# Patient Record
(demographics unavailable — no encounter records)

---

## 2024-10-28 NOTE — HISTORY OF PRESENT ILLNESS
[FreeTextEntry1] : Patient presents for a post surgical visit.  35 yo POD14 s/p TLH, BS, ovarian cystectomy, cystoscopy presenting for 2-week post-op visit. Overall she is feeling well. Denies abdominal pain. Having normal BMs. Ambulating and eating without difficulty and as resumed low intensity daily activities. Last night she passed a 3cm clot when voiding but denies bleeding since. Normally has a small smear of red or brown the last 2 weeks. Denies pain today.

## 2024-10-28 NOTE — PLAN
[FreeTextEntry1] : 33 yo POD14 s/p TLH, BS, ovarian cystectomy, cystoscopy presenting for 2-week post-op visit. Diagnosis of endometriosis and implications reviewed, although patient no longer has a uterus, she may still form endometriomas. -incisions well-healing -no need to perform pelvic exam as patient is no longer bleeding- will check cuff at next visit -ED precautions reviewed, including heavy vaginal bleeding, abdominal pain, syncope -extensively reviewed post-op course and all questions answered -will return to work 4 weeks post-op  RTO in 4 weeks for 6-week post-op

## 2024-12-03 NOTE — HISTORY OF PRESENT ILLNESS
[Patient reported PAP Smear was normal] : Patient reported PAP Smear was normal [FreeTextEntry1] : 35yo s/p TLH, BS, ovarian cystectomy and cystoscopy for adenomyosis, endometriosis and b/l endometriomas. Patient states she is feeling well. Denies current pain or bleeding. States she stopped spotting 1 week ago. Walking without difficulty. Endorsing regular BM. Endorsing intense urge to urinate when it comes on. States she has not been incontinent but feels she has to run to the bathroom as soon as she gets the urge. Denies any dysuria, vaginal discharge or discomfort.  [PapSmeardate] : 5/2024

## 2024-12-03 NOTE — PHYSICAL EXAM
[Chaperone Present] : A chaperone was present in the examining room during all aspects of the physical examination [Appropriately responsive] : appropriately responsive [Alert] : alert [No Acute Distress] : no acute distress [Soft] : soft [Non-tender] : non-tender [Non-distended] : non-distended [No Lesions] : no lesions [No Mass] : no mass [Oriented x3] : oriented x3 [Labia Majora] : normal [Labia Minora] : normal [Normal] : normal [Absent] : absent [Uterine Adnexae] : non-palpable [FreeTextEntry5] : vaginal cuff intact, no pus or visualized leakage of fluid [FreeTextEntry8] : no tenderness on bimanual. Vaginal cuff intact

## 2024-12-03 NOTE — REVIEW OF SYSTEMS
[Urgency] : urgency [Negative] : Neurological [Frequency] : no frequency [Incontinence] : no incontinence [Dysuria] : no dysuria [Urethral Discharge] : no urethral discharge [Abn Vaginal bleeding] : no abnormal vaginal bleeding [Pelvic pain] : no pelvic pain [CVA Pain] : no CVA pain [Genital Rash/Irritation] : no genital rash/irritation

## 2024-12-03 NOTE — PLAN
[FreeTextEntry1] : 33yo POD43 s/p TLH, BS, ovarian cystectomy and cystoscopy for adenomyosis, endometriosis and b/l endometriomas. Healing appropriately, with no pain/bleeding and vaginal cuff intact on exam.   #Postop -Patient cleared for physical activity.   #Urinary urgency -Referral given for pelvic floor therapy -Reviewed kegel exercises  #Health Maintenance -Patient states she will continue GYN care with prior provider  MB, PGY1 D/w Dr. Hamilton

## 2024-12-03 NOTE — PLAN
[FreeTextEntry1] : 35yo POD43 s/p TLH, BS, ovarian cystectomy and cystoscopy for adenomyosis, endometriosis and b/l endometriomas. Healing appropriately, with no pain/bleeding and vaginal cuff intact on exam.   #Postop -Patient cleared for physical activity.   #Urinary urgency -Referral given for pelvic floor therapy -Reviewed kegel exercises  #Health Maintenance -Patient states she will continue GYN care with prior provider  MB, PGY1 D/w Dr. Hamilton

## 2024-12-03 NOTE — HISTORY OF PRESENT ILLNESS
[Patient reported PAP Smear was normal] : Patient reported PAP Smear was normal [FreeTextEntry1] : 33yo s/p TLH, BS, ovarian cystectomy and cystoscopy for adenomyosis, endometriosis and b/l endometriomas. Patient states she is feeling well. Denies current pain or bleeding. States she stopped spotting 1 week ago. Walking without difficulty. Endorsing regular BM. Endorsing intense urge to urinate when it comes on. States she has not been incontinent but feels she has to run to the bathroom as soon as she gets the urge. Denies any dysuria, vaginal discharge or discomfort.  [PapSmeardate] : 5/2024